# Patient Record
Sex: FEMALE | Race: WHITE | NOT HISPANIC OR LATINO | Employment: UNEMPLOYED | URBAN - METROPOLITAN AREA
[De-identification: names, ages, dates, MRNs, and addresses within clinical notes are randomized per-mention and may not be internally consistent; named-entity substitution may affect disease eponyms.]

---
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
## 2025-04-30 ENCOUNTER — HOSPITAL ENCOUNTER (EMERGENCY)
Facility: HOSPITAL | Age: 20
Discharge: HOME | End: 2025-04-30
Attending: STUDENT IN AN ORGANIZED HEALTH CARE EDUCATION/TRAINING PROGRAM
Payer: COMMERCIAL

## 2025-04-30 VITALS
TEMPERATURE: 98 F | WEIGHT: 254 LBS | RESPIRATION RATE: 20 BRPM | SYSTOLIC BLOOD PRESSURE: 149 MMHG | DIASTOLIC BLOOD PRESSURE: 77 MMHG | BODY MASS INDEX: 38.49 KG/M2 | HEART RATE: 67 BPM | OXYGEN SATURATION: 100 % | HEIGHT: 68 IN

## 2025-04-30 DIAGNOSIS — L02.91 ABSCESS: Primary | ICD-10-CM

## 2025-04-30 PROCEDURE — 99283 EMERGENCY DEPT VISIT LOW MDM: CPT | Mod: 25

## 2025-04-30 PROCEDURE — 63600175 PHARM REV CODE 636 W HCPCS: Performed by: STUDENT IN AN ORGANIZED HEALTH CARE EDUCATION/TRAINING PROGRAM

## 2025-04-30 PROCEDURE — 10060 I&D ABSCESS SIMPLE/SINGLE: CPT

## 2025-04-30 RX ORDER — DOXYCYCLINE 100 MG/1
100 CAPSULE ORAL 2 TIMES DAILY
Qty: 14 CAPSULE | Refills: 0 | Status: SHIPPED | OUTPATIENT
Start: 2025-04-30 | End: 2025-05-07

## 2025-04-30 RX ORDER — LIDOCAINE HYDROCHLORIDE 10 MG/ML
5 INJECTION, SOLUTION INFILTRATION; PERINEURAL
Status: COMPLETED | OUTPATIENT
Start: 2025-04-30 | End: 2025-04-30

## 2025-04-30 RX ORDER — CLINDAMYCIN HYDROCHLORIDE 300 MG/1
300 CAPSULE ORAL 3 TIMES DAILY
COMMUNITY
Start: 2025-04-19

## 2025-04-30 RX ORDER — MUPIROCIN 20 MG/G
OINTMENT TOPICAL 3 TIMES DAILY
COMMUNITY
Start: 2025-04-19

## 2025-04-30 RX ADMIN — LIDOCAINE HYDROCHLORIDE 5 ML: 10 INJECTION, SOLUTION INFILTRATION; PERINEURAL at 09:04

## 2025-04-30 NOTE — ED PROVIDER NOTES
Encounter Date: 4/30/2025       History     Chief Complaint   Patient presents with    Abscess     Left upper thigh abscess started 2 weeks ago. LOP 10/10. Went to urgent care placed on Bactroban and Clindamycin and has finished this.      Patient is a 19-year-old white female no significant past medical history presents to the ER today with an area of redness to her left inner thigh.  She states he has been present for the last 2 weeks and she went to local urgent care and was prescribed clindamycin.  She states that did somewhat improve however it still present.  She denies any other complaints at this time.  She denies a history of inguinal or axillary abscesses that are recurrent.  The area of concern is to her left inner thigh.  Denies any fever, chills, cough, congestion, chest pain, shortness of breath or abdominal pain.      Review of patient's allergies indicates:  No Known Allergies  History reviewed. No pertinent past medical history.  History reviewed. No pertinent surgical history.  No family history on file.  Social History[1]  Review of Systems   Constitutional:  Negative for chills, fatigue and fever.   HENT:  Negative for congestion, sore throat and trouble swallowing.    Eyes:  Negative for pain and visual disturbance.   Respiratory:  Negative for cough, shortness of breath and wheezing.    Cardiovascular:  Negative for chest pain and palpitations.   Gastrointestinal:  Negative for abdominal pain, blood in stool, constipation, diarrhea, nausea and vomiting.   Genitourinary:  Negative for dysuria and hematuria.   Musculoskeletal:  Negative for back pain and myalgias.   Skin:  Positive for rash and wound.   Neurological:  Negative for seizures, syncope and headaches.   Psychiatric/Behavioral:  Negative for confusion. The patient is not nervous/anxious.        Physical Exam     Initial Vitals [04/30/25 0850]   BP Pulse Resp Temp SpO2   (!) 149/77 67 20 98.2 °F (36.8 °C) 100 %      MAP       --          Physical Exam    Nursing note and vitals reviewed.  Constitutional: She appears well-developed and well-nourished. She is not diaphoretic. No distress.   HENT:   Head: Normocephalic.   Right Ear: External ear normal.   Left Ear: External ear normal.   Nose: Nose normal.   Eyes: Conjunctivae and EOM are normal. Right eye exhibits no discharge. Left eye exhibits no discharge. No scleral icterus.   Neck:   Normal range of motion.  Cardiovascular:  Normal rate, regular rhythm and normal heart sounds.     Exam reveals no gallop and no friction rub.       No murmur heard.  Pulmonary/Chest: Breath sounds normal. No stridor. No respiratory distress. She has no wheezes. She has no rhonchi. She has no rales.   Musculoskeletal:         General: Normal range of motion.      Cervical back: Normal range of motion.     Neurological: She is alert.   Skin: Skin is warm. No rash noted. No erythema.        Psychiatric: She has a normal mood and affect. Her behavior is normal.         ED Course   I & D - Incision and Drainage    Date/Time: 2025 9:16 AM  Location procedure was performed: [facility] EMERGENCY DEPARTMENT    Performed by: Jonn Doshi MD  Authorized by: Jonn Doshi MD  Pre-operative diagnosis: Abscess  Post-operative diagnosis: Abscess  Consent Done: Yes  Consent: Verbal consent obtained  Risks and benefits: risks, benefits and alternatives were discussed  Consent given by: patient  Patient identity confirmed: , name, verbally with patient and MRN  Type: abscess  Body area: lower extremity  Location details: left leg  Anesthesia: local infiltration    Anesthesia:  Local Anesthetic: lidocaine 1% without epinephrine  Anesthetic total: 3 mL    Patient sedated: no  Description of findings: Purulent drainage   Scalpel size: 11  Incision type: single straight  Incision depth: dermal  Complexity: simple  Drainage: pus  Drainage amount: scant  Wound treatment: incision and drainage  Technical procedures used: Point of  care ultrasound  Complications: No  Estimated blood loss (mL): 0  Specimens: No  Implants: No    Incision depth: dermal        Labs Reviewed - No data to display       Imaging Results    None          Medications   LIDOcaine HCL 10 mg/ml (1%) injection 5 mL (5 mLs Infiltration Given 4/30/25 5215)     Medical Decision Making  Differentials: Abscess, necrotizing fasciitis, hidradenitis suppurativa, cellulitis   History is provided by the patient   19-year-old well-appearing female with stable vital signs presents to the ER today with findings on physical exam most consistent with an abscess formation.  Point of care ultrasound did show an area of fluid accumulation measuring 0.9cm x 1.1cm.  Verbal consent given by patient after risks and benefits were discussed for incision and drainage.  Wound thoroughly cleaned and purulent drainage was expelled.  Wound left open.  Given the rather small nature of this abscess iodoform was not used to pack the wound.  Will start patient on doxycycline as she states no concerns for pregnancy.  She still has mupirocin advised to applied 2 times daily.  Keep wound clean and dry.  All questions answered in layman's terms, ER return precautions discussed and close follow up with the PCP was recommended.    Risk  Prescription drug management.                                      Clinical Impression:  Final diagnoses:  [L02.91] Abscess (Primary)          ED Disposition Condition    Discharge Stable          ED Prescriptions       Medication Sig Dispense Start Date End Date Auth. Provider    doxycycline (VIBRAMYCIN) 100 MG Cap Take 1 capsule (100 mg total) by mouth 2 (two) times daily. for 7 days 14 capsule 4/30/2025 5/7/2025 Jonn Doshi MD          Follow-up Information       Follow up With Specialties Details Why Contact Info    [facility] - Emergency Dept Emergency Medicine  If symptoms worsen [address] [place] [id number] [phone]    Giuliana Pretty,  MD Family Medicine Schedule an appointment as soon as possible for a visit   [address] [address]  [phone]                 [1]         Jonn Doshi MD  04/30/25 0963